# Patient Record
Sex: FEMALE
[De-identification: names, ages, dates, MRNs, and addresses within clinical notes are randomized per-mention and may not be internally consistent; named-entity substitution may affect disease eponyms.]

---

## 2018-04-10 ENCOUNTER — HOSPITAL ENCOUNTER (OUTPATIENT)
Dept: HOSPITAL 92 - BICMAMMO | Age: 48
Discharge: HOME | End: 2018-04-10
Attending: PHYSICIAN ASSISTANT
Payer: COMMERCIAL

## 2018-04-10 DIAGNOSIS — Z12.31: Primary | ICD-10-CM

## 2018-04-10 PROCEDURE — 77067 SCR MAMMO BI INCL CAD: CPT

## 2018-04-10 PROCEDURE — 77063 BREAST TOMOSYNTHESIS BI: CPT

## 2018-10-02 ENCOUNTER — HOSPITAL ENCOUNTER (INPATIENT)
Dept: HOSPITAL 92 - ERS | Age: 48
LOS: 3 days | Discharge: HOME | DRG: 639 | End: 2018-10-05
Attending: FAMILY MEDICINE | Admitting: FAMILY MEDICINE
Payer: COMMERCIAL

## 2018-10-02 VITALS — BODY MASS INDEX: 37 KG/M2

## 2018-10-02 DIAGNOSIS — J40: ICD-10-CM

## 2018-10-02 DIAGNOSIS — E86.0: ICD-10-CM

## 2018-10-02 DIAGNOSIS — E11.65: Primary | ICD-10-CM

## 2018-10-02 DIAGNOSIS — E66.9: ICD-10-CM

## 2018-10-02 DIAGNOSIS — Z79.84: ICD-10-CM

## 2018-10-02 DIAGNOSIS — Z88.0: ICD-10-CM

## 2018-10-02 LAB
ALBUMIN SERPL BCG-MCNC: 4.5 G/DL (ref 3.5–5)
ALP SERPL-CCNC: 232 U/L (ref 40–150)
ALT SERPL W P-5'-P-CCNC: 41 U/L (ref 8–55)
ANION GAP SERPL CALC-SCNC: 14 MMOL/L (ref 10–20)
ANION GAP SERPL CALC-SCNC: 20 MMOL/L (ref 10–20)
AST SERPL-CCNC: 28 U/L (ref 5–34)
BASOPHILS # BLD AUTO: 0.1 THOU/UL (ref 0–0.2)
BASOPHILS NFR BLD AUTO: 1 % (ref 0–1)
BICARBONATE (HCO3V): 22.2 MMOL/L (ref 1–85)
BILIRUB SERPL-MCNC: 0.8 MG/DL (ref 0.2–1.2)
BUN SERPL-MCNC: 10 MG/DL (ref 7–18.7)
BUN SERPL-MCNC: 12 MG/DL (ref 7–18.7)
CA-I BLD-SCNC: 1.17 MMOL/L (ref 1.12–1.32)
CALCIUM SERPL-MCNC: 10.4 MG/DL (ref 7.8–10.44)
CALCIUM SERPL-MCNC: 9.3 MG/DL (ref 7.8–10.44)
CHLORIDE SERPL-SCNC: 104 MMOL/L (ref 98–107)
CHLORIDE SERPL-SCNC: 93 MMOL/L (ref 98–107)
CHLORIDE SERPL-SCNC: 96 MMOL/L (ref 98–113)
CK MB SERPL-MCNC: 1.1 NG/ML (ref 0–6.6)
CK MB SERPL-MCNC: 1.3 NG/ML (ref 0–6.6)
CO2 BLDV CALC-SCNC: 23 MMOL/L (ref 1–85)
CO2 SERPL-SCNC: 18 MMOL/L (ref 22–29)
CO2 SERPL-SCNC: 22 MMOL/L (ref 22–29)
CO2 TENSION (PVCO2): 26.4 MMHG (ref 41–51)
CREAT CL PREDICTED SERPL C-G-VRATE: 0 ML/MIN (ref 70–130)
CREAT CL PREDICTED SERPL C-G-VRATE: 109 ML/MIN (ref 70–130)
EOSINOPHIL # BLD AUTO: 0.2 THOU/UL (ref 0–0.7)
EOSINOPHIL NFR BLD AUTO: 2 % (ref 0–10)
GLOBULIN SER CALC-MCNC: 3.4 G/DL (ref 2.4–3.5)
GLUCOSE SERPL-MCNC: 342 MG/DL (ref 70–105)
GLUCOSE SERPL-MCNC: 751 MG/DL (ref 70–105)
GLUCOSE UR STRIP-MCNC: >=1000 MG/DL
HCT VFR BLD CALC: 47 % (ref 36–52)
HEMOGLOBIN - CALC: 15.9 G/DL (ref 12–18)
HGB BLD-MCNC: 14.9 G/DL (ref 12–16)
LIPASE SERPL-CCNC: 40 U/L (ref 8–78)
LYMPHOCYTES # BLD: 2.1 THOU/UL (ref 1.2–3.4)
LYMPHOCYTES NFR BLD AUTO: 25.1 % (ref 21–51)
MAGNESIUM SERPL-MCNC: 1.7 MG/DL (ref 1.6–2.6)
MAGNESIUM SERPL-MCNC: 2 MG/DL (ref 1.6–2.6)
MCH RBC QN AUTO: 28.1 PG (ref 27–31)
MCV RBC AUTO: 83.8 FL (ref 78–98)
MONOCYTES # BLD AUTO: 0.5 THOU/UL (ref 0.11–0.59)
MONOCYTES NFR BLD AUTO: 6.3 % (ref 0–10)
NEUTROPHILS # BLD AUTO: 5.6 THOU/UL (ref 1.4–6.5)
NEUTROPHILS NFR BLD AUTO: 65.7 % (ref 42–75)
O2 TENSION (PVO2): 42.9 MMHG (ref 35–45)
PLATELET # BLD AUTO: 188 THOU/UL (ref 130–400)
POTASSIUM SERPL-SCNC: 3.6 MMOL/L (ref 3.5–5.1)
POTASSIUM SERPL-SCNC: 4.5 MMOL/L (ref 3.5–5.1)
POTASSIUM SERPL-SCNC: 4.8 MMOL/L (ref 3.4–4.7)
RBC # BLD AUTO: 5.31 MILL/UL (ref 4.2–5.4)
SAO2 % BLDV FROM PO2: 85 % (ref 94–98)
SODIUM SERPL-SCNC: 126 MMOL/L (ref 136–145)
SODIUM SERPL-SCNC: 131 MMOL/L (ref 138–145)
SODIUM SERPL-SCNC: 136 MMOL/L (ref 136–145)
SP GR UR STRIP: 1.03 (ref 1–1.04)
TROPONIN I SERPL DL<=0.01 NG/ML-MCNC: (no result) NG/ML (ref ?–0.03)
TROPONIN I SERPL DL<=0.01 NG/ML-MCNC: (no result) NG/ML (ref ?–0.03)
WBC # BLD AUTO: 8.5 THOU/UL (ref 4.8–10.8)

## 2018-10-02 PROCEDURE — 80053 COMPREHEN METABOLIC PANEL: CPT

## 2018-10-02 PROCEDURE — 82010 KETONE BODYS QUAN: CPT

## 2018-10-02 PROCEDURE — 83930 ASSAY OF BLOOD OSMOLALITY: CPT

## 2018-10-02 PROCEDURE — S0028 INJECTION, FAMOTIDINE, 20 MG: HCPCS

## 2018-10-02 PROCEDURE — 80048 BASIC METABOLIC PNL TOTAL CA: CPT

## 2018-10-02 PROCEDURE — 93010 ELECTROCARDIOGRAM REPORT: CPT

## 2018-10-02 PROCEDURE — 96361 HYDRATE IV INFUSION ADD-ON: CPT

## 2018-10-02 PROCEDURE — 36416 COLLJ CAPILLARY BLOOD SPEC: CPT

## 2018-10-02 PROCEDURE — 82330 ASSAY OF CALCIUM: CPT

## 2018-10-02 PROCEDURE — 84484 ASSAY OF TROPONIN QUANT: CPT

## 2018-10-02 PROCEDURE — 84681 ASSAY OF C-PEPTIDE: CPT

## 2018-10-02 PROCEDURE — 83690 ASSAY OF LIPASE: CPT

## 2018-10-02 PROCEDURE — 85025 COMPLETE CBC W/AUTO DIFF WBC: CPT

## 2018-10-02 PROCEDURE — 71045 X-RAY EXAM CHEST 1 VIEW: CPT

## 2018-10-02 PROCEDURE — 96365 THER/PROPH/DIAG IV INF INIT: CPT

## 2018-10-02 PROCEDURE — 36415 COLL VENOUS BLD VENIPUNCTURE: CPT

## 2018-10-02 PROCEDURE — 93005 ELECTROCARDIOGRAM TRACING: CPT

## 2018-10-02 PROCEDURE — 83036 HEMOGLOBIN GLYCOSYLATED A1C: CPT

## 2018-10-02 PROCEDURE — 83735 ASSAY OF MAGNESIUM: CPT

## 2018-10-02 PROCEDURE — 81003 URINALYSIS AUTO W/O SCOPE: CPT

## 2018-10-02 PROCEDURE — 82553 CREATINE MB FRACTION: CPT

## 2018-10-02 PROCEDURE — 71046 X-RAY EXAM CHEST 2 VIEWS: CPT

## 2018-10-02 PROCEDURE — 82803 BLOOD GASES ANY COMBINATION: CPT

## 2018-10-02 NOTE — RAD
1 VIEW CHEST:

 

Date:  10/02/18

 

HISTORY:  

Shortness of breath. 

 

COMPARISON:  

None. 

 

FINDINGS:

Normal cardiac silhouette. Pulmonary vessels and hilum are normal. Costophrenic angles are clear. No 
mass. No consolidation. No pneumothorax or osseous abnormalities. 

 

IMPRESSION: 

No acute cardiopulmonary process. 

 

 

POS: [facility]

## 2018-10-03 LAB
ALBUMIN SERPL BCG-MCNC: 3.8 G/DL (ref 3.5–5)
ALP SERPL-CCNC: 131 U/L (ref 40–150)
ALT SERPL W P-5'-P-CCNC: 31 U/L (ref 8–55)
ANION GAP SERPL CALC-SCNC: 10 MMOL/L (ref 10–20)
AST SERPL-CCNC: 27 U/L (ref 5–34)
BILIRUB SERPL-MCNC: 0.8 MG/DL (ref 0.2–1.2)
BUN SERPL-MCNC: 10 MG/DL (ref 7–18.7)
CALCIUM SERPL-MCNC: 9.1 MG/DL (ref 7.8–10.44)
CHLORIDE SERPL-SCNC: 105 MMOL/L (ref 98–107)
CO2 SERPL-SCNC: 24 MMOL/L (ref 22–29)
CREAT CL PREDICTED SERPL C-G-VRATE: 122 ML/MIN (ref 70–130)
GLOBULIN SER CALC-MCNC: 2.7 G/DL (ref 2.4–3.5)
GLUCOSE SERPL-MCNC: 267 MG/DL (ref 70–105)
POTASSIUM SERPL-SCNC: 3.9 MMOL/L (ref 3.5–5.1)
SODIUM SERPL-SCNC: 135 MMOL/L (ref 136–145)

## 2018-10-03 RX ADMIN — INSULIN LISPRO PRN UNIT: 100 INJECTION, SOLUTION INTRAVENOUS; SUBCUTANEOUS at 12:39

## 2018-10-03 RX ADMIN — INSULIN LISPRO PRN UNIT: 100 INJECTION, SOLUTION INTRAVENOUS; SUBCUTANEOUS at 17:15

## 2018-10-03 RX ADMIN — INSULIN GLARGINE SCH MLS: 100 INJECTION, SOLUTION SUBCUTANEOUS at 09:52

## 2018-10-03 RX ADMIN — INSULIN LISPRO PRN UNIT: 100 INJECTION, SOLUTION INTRAVENOUS; SUBCUTANEOUS at 06:03

## 2018-10-03 NOTE — HP
DATE OF ADMISSION:  10/03/2018

 

HISTORY OF PRESENT ILLNESS:  This is a 48-year-old Latin-American female with new onset diabetes who 
presents with polydipsia, polyuria.  The patient was seen in the office in March earlier this year an
d had routine labs, which were unremarkable.  Approximately 2 weeks ago, she began developing a cough
 which had become progressively worse and she was seen in the office yesterday and was diagnosed with
 bronchitis, given Levaquin and _____, but had yet to start it.  She was also complaining of polydips
ia, polyuria, nocturia and was drinking lots of juice to quench her thirst.  Routine labs were obtain
ed at that time and her blood sugar was noted to be over 800.  She was called yesterday evening by [facility] office to report to the emergency room for further evaluation.  In the ER, she was given IV fluids 
and insulin and she states she is feeling much better.  She still has a slight cough, but decreased. 
 She was started on IV Levaquin at that time.

 

PAST MEDICAL HISTORY:  Unremarkable.

 

PAST SURGICAL HISTORY:  None.

 

FAMILY HISTORY:  Father with hypertension, mother with hypertension.  No other family history of diab
etes.

 

SOCIAL HISTORY:  She is a nonsmoker.  She is single.  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.  She does have two sons, age 30 and 27.

 

MEDICATIONS:  None.

 

ALLERGIES:  PENICILLIN, which causes a rash.

 

REVIEW OF SYSTEMS:  As above.

 

PHYSICAL EXAMINATION:

VITAL SIGNS:  Temperature 98.3, pulse 75, respiration 20, pulse ox 99, blood pressure 131/76.

GENERAL:  No acute distress at this time, does have a slight cough.

HEENT:  Clear.

NECK:  Supple.

HEART:  Regular rate and rhythm.

LUNGS:  Clear.

ABDOMEN:  Soft.  Mild subcostal tenderness secondary to the cough.

EXTREMITIES:  With no edema.

 

LABORATORY DATA:  White count 8.5, H&H is 14 and 44, platelet of 188.  Sodium on admission was 131, p
otassium 4.8.  Blood sugar 383.  Sodium this morning was 135, potassium 3.9, CO2 of 24, creatinine 0.
75, BUN 10, blood sugar 267.  Troponin one less than 0.010.

 

X-RAY FINDINGS:  Chest x-ray negative.

 

ASSESSMENT:

1.  New onset diabetes.

2.  Dehydration.

3.  Bronchitis.

4.  Obesity.

5.  Sedentary lifestyle with poor diet, consisting mostly of bread and fried foods.

 

PLAN:

1.  Discussed diabetes at length.

2.  Insulin sliding scale.

3.  Continue to hydrate and provide insulin and start p.o. medications.

4.  Discussed diet at length and the patient will start to cook with the use of olive oil and _____ i
nformed her that she should no longer buy any frying oil and no longer stern her foods and to cut out h
er breads.

## 2018-10-03 NOTE — RAD
CHEST 2 VIEWS:

 

Date  10/03/18 

 

HISTORY:  

Pneumonia. Dehydration. 

 

COMPARISON:  

Radiograph prior day. 

 

FINDINGS:

There is some scarring in the lingula. Lungs are otherwise clear. No pneumothorax or effusion. Cardia
c silhouette and mediastinal contours are similar. No acute osseous abnormality. 

 

IMPRESSION: 

No acute intrathoracic abnormality. 

 

 

POS: [facility]

## 2018-10-04 LAB
ANION GAP SERPL CALC-SCNC: 11 MMOL/L (ref 10–20)
BASOPHILS # BLD AUTO: 0 THOU/UL (ref 0–0.2)
BASOPHILS NFR BLD AUTO: 0.6 % (ref 0–1)
BUN SERPL-MCNC: 8 MG/DL (ref 7–18.7)
CALCIUM SERPL-MCNC: 9.1 MG/DL (ref 7.8–10.44)
CHLORIDE SERPL-SCNC: 104 MMOL/L (ref 98–107)
CO2 SERPL-SCNC: 24 MMOL/L (ref 22–29)
CREAT CL PREDICTED SERPL C-G-VRATE: 119 ML/MIN (ref 70–130)
EOSINOPHIL # BLD AUTO: 0.2 THOU/UL (ref 0–0.7)
EOSINOPHIL NFR BLD AUTO: 3.3 % (ref 0–10)
GLUCOSE SERPL-MCNC: 289 MG/DL (ref 70–105)
HGB BLD-MCNC: 12.8 G/DL (ref 12–16)
LYMPHOCYTES # BLD: 1.9 THOU/UL (ref 1.2–3.4)
LYMPHOCYTES NFR BLD AUTO: 24.8 % (ref 21–51)
MCH RBC QN AUTO: 28.1 PG (ref 27–31)
MCV RBC AUTO: 83.5 FL (ref 78–98)
MONOCYTES # BLD AUTO: 0.5 THOU/UL (ref 0.11–0.59)
MONOCYTES NFR BLD AUTO: 6.3 % (ref 0–10)
NEUTROPHILS # BLD AUTO: 4.8 THOU/UL (ref 1.4–6.5)
NEUTROPHILS NFR BLD AUTO: 65 % (ref 42–75)
PLATELET # BLD AUTO: 162 THOU/UL (ref 130–400)
POTASSIUM SERPL-SCNC: 3.9 MMOL/L (ref 3.5–5.1)
RBC # BLD AUTO: 4.57 MILL/UL (ref 4.2–5.4)
SODIUM SERPL-SCNC: 135 MMOL/L (ref 136–145)
WBC # BLD AUTO: 7.5 THOU/UL (ref 4.8–10.8)

## 2018-10-04 RX ADMIN — INSULIN GLARGINE SCH: 100 INJECTION, SOLUTION SUBCUTANEOUS at 09:47

## 2018-10-04 RX ADMIN — INSULIN LISPRO PRN UNIT: 100 INJECTION, SOLUTION INTRAVENOUS; SUBCUTANEOUS at 12:43

## 2018-10-04 RX ADMIN — INSULIN LISPRO PRN UNIT: 100 INJECTION, SOLUTION INTRAVENOUS; SUBCUTANEOUS at 06:10

## 2018-10-04 RX ADMIN — INSULIN LISPRO PRN UNIT: 100 INJECTION, SOLUTION INTRAVENOUS; SUBCUTANEOUS at 17:10

## 2018-10-04 NOTE — PRG
DATE OF SERVICE:  10/04/2018 at 8:00 a.m.

 

SUBJECTIVE:  The patient last night had some nausea and vomiting.  This was possibly after her p.o. d
ose of Levaquin.  This morning she is somewhat nauseated, but without vomiting.

 

OBJECTIVE:

VITAL SIGNS:  Temperature 98.8, pulse 81, respirations 20, pulse ox 98, blood pressure 160//86.


HEART:  Regular rate and rhythm.

LUNGS:  Clear.

ABDOMEN:  Soft, nontender.

EXTREMITIES:  With no edema.

 

LABORATORY:  White count is 7.5, H&H 12 and 38.  Sodium 135, potassium 3.9, creatinine 0.77, BUN 8, b
lood sugar 330, 289, 293.

 

ASSESSMENT:

1.  New onset diabetes mellitus.

2.  Dehydration, resolving.

3.  Bronchitis.

4.  Nausea, vomiting.

5.  Obesity.

6.  Sedentary lifestyle.

 

PLAN:

1.  We will decrease Levaquin from 750 to 500 p.o. daily.  

2.  Decrease metformin to 500 p.o. q.a.m.

3.  Continue to hydrate.

4.  Glucometer use education.

5.  Hopefully can discharge home in the a.m.

6.  Continue diabetic education.

## 2018-10-05 VITALS — DIASTOLIC BLOOD PRESSURE: 85 MMHG | TEMPERATURE: 98.2 F | SYSTOLIC BLOOD PRESSURE: 138 MMHG

## 2018-10-05 LAB
ANION GAP SERPL CALC-SCNC: 12 MMOL/L (ref 10–20)
BASOPHILS # BLD AUTO: 0 THOU/UL (ref 0–0.2)
BASOPHILS NFR BLD AUTO: 0.7 % (ref 0–1)
BUN SERPL-MCNC: 8 MG/DL (ref 7–18.7)
CALCIUM SERPL-MCNC: 9.1 MG/DL (ref 7.8–10.44)
CHLORIDE SERPL-SCNC: 104 MMOL/L (ref 98–107)
CO2 SERPL-SCNC: 23 MMOL/L (ref 22–29)
CREAT CL PREDICTED SERPL C-G-VRATE: 123 ML/MIN (ref 70–130)
EOSINOPHIL # BLD AUTO: 0.3 THOU/UL (ref 0–0.7)
EOSINOPHIL NFR BLD AUTO: 3.7 % (ref 0–10)
GLUCOSE SERPL-MCNC: 271 MG/DL (ref 70–105)
HGB BLD-MCNC: 12.9 G/DL (ref 12–16)
LYMPHOCYTES # BLD: 1.8 THOU/UL (ref 1.2–3.4)
LYMPHOCYTES NFR BLD AUTO: 25.4 % (ref 21–51)
MCH RBC QN AUTO: 28.1 PG (ref 27–31)
MCV RBC AUTO: 84.1 FL (ref 78–98)
MONOCYTES # BLD AUTO: 0.4 THOU/UL (ref 0.11–0.59)
MONOCYTES NFR BLD AUTO: 5.7 % (ref 0–10)
NEUTROPHILS # BLD AUTO: 4.4 THOU/UL (ref 1.4–6.5)
NEUTROPHILS NFR BLD AUTO: 64.5 % (ref 42–75)
PLATELET # BLD AUTO: 157 THOU/UL (ref 130–400)
POTASSIUM SERPL-SCNC: 3.7 MMOL/L (ref 3.5–5.1)
RBC # BLD AUTO: 4.59 MILL/UL (ref 4.2–5.4)
SODIUM SERPL-SCNC: 135 MMOL/L (ref 136–145)
WBC # BLD AUTO: 6.9 THOU/UL (ref 4.8–10.8)

## 2018-10-05 RX ADMIN — INSULIN LISPRO PRN UNIT: 100 INJECTION, SOLUTION INTRAVENOUS; SUBCUTANEOUS at 06:04

## 2018-10-05 RX ADMIN — INSULIN GLARGINE SCH MLS: 100 INJECTION, SOLUTION SUBCUTANEOUS at 08:55

## 2018-10-05 NOTE — DIS
DATE OF ADMISSION:  10/02/2018

 

DATE OF DISCHARGE:  10/05/2018

 

DISCHARGE DIAGNOSES:

1.  New onset diabetes mellitus.

2.  Dehydration.

3.  Bronchitis.

4.  Nausea, vomiting.

5.  Obesity. 

6.  Sedentary lifestyle.

 

DISCHARGE MEDICATIONS:  Glucovance 55 one p.o. b.i.d. #60, 2 refills; aspirin 81 mg daily, lisinopril
 10 mg p.o. daily #90, one refill, Ultram 50 one p.o. q.6h. p.r.n. #30 and a glucometer and supplies.


 

FOLLOWUP:  

1.  Almita Shafer on Monday or Tuesday.

2.  Follow up Dr. RYAN Chairez in 2 weeks.

3.  Begin Glucovance 5/500 q.a.m. and increase to b.i.d. if blood sugars are greater than 200.

4.  Continue hydration.

5.  Call the office this weekend and for any questions whatsoever.

6.  Discussed diet and lifestyle at length.

 

BRIEF HISTORY:  This is a 48-year-old Latin-American female admitted for new onset diabetes mellitus.
  She presented with polydipsia, polyuria.  No nocturia.  Approximately 2 weeks prior, she began deve
loping a cough and congestion and she slowly became worse.  She also developed the polydipsia and kyra
yuria, and she was clenching her thirst with juices.  She then presented to the office and routine la
bs revealed a blood sugar of 800.  She was then called to come to the ER for further treatment.

 

HOSPITAL COURSE:  The patient was started on insulin.  She was given large amounts of IV fluids.  She
 was also given IV Levaquin.  She has done well over several days.  Her blood sugar has decreased fro
m 800 now in the 200 range.  We will discharge on the above medications.  She will start with Glucova
nce 5/500mg q.a.m.  If her blood sugars rise significantly she is to begin taking them twice a day.  
She will follow up with Almita Shafer next week and myself in 2 weeks.  I have given her instruction
s about diabetes.  In the office later on, we will begin her on lipid lowering medications.  If she h
as any questions with the use of a glucometer she is to come to the office and my nurse will work wit
h her.

## 2018-10-07 NOTE — EKG
Test Reason : STAT

Blood Pressure : ***/*** mmHG

Vent. Rate : 076 BPM     Atrial Rate : 076 BPM

   P-R Int : 154 ms          QRS Dur : 086 ms

    QT Int : 424 ms       P-R-T Axes : 047 001 024 degrees

   QTc Int : 477 ms

 

Normal sinus rhythm

Normal ECG

No previous ECGs available

Confirmed by BANDAR GOOD ([id number]) on 10/7/2018 8:21:18 PM

 

Referred By:  LETICIA           Confirmed By:BANDAR GOOD

## 2018-10-25 ENCOUNTER — HOSPITAL ENCOUNTER (OUTPATIENT)
Dept: HOSPITAL 92 - DTY/OP | Age: 48
Discharge: HOME | End: 2018-10-25
Attending: FAMILY MEDICINE
Payer: COMMERCIAL

## 2018-10-25 DIAGNOSIS — E11.9: Primary | ICD-10-CM

## 2018-10-25 PROCEDURE — 97802 MEDICAL NUTRITION INDIV IN: CPT

## 2023-09-04 ENCOUNTER — HOSPITAL ENCOUNTER (EMERGENCY)
Dept: HOSPITAL 92 - CSHERS | Age: 53
Discharge: HOME | End: 2023-09-04
Payer: SELF-PAY

## 2023-09-04 DIAGNOSIS — E11.9: ICD-10-CM

## 2023-09-04 DIAGNOSIS — U07.1: Primary | ICD-10-CM

## 2023-09-04 LAB — SARS-COV-2 RNA RESP QL NAA+PROBE: DETECTED

## 2023-09-04 PROCEDURE — 99283 EMERGENCY DEPT VISIT LOW MDM: CPT
